# Patient Record
Sex: MALE | Race: WHITE | NOT HISPANIC OR LATINO | Employment: OTHER | URBAN - NONMETROPOLITAN AREA
[De-identification: names, ages, dates, MRNs, and addresses within clinical notes are randomized per-mention and may not be internally consistent; named-entity substitution may affect disease eponyms.]

---

## 2023-03-02 PROBLEM — J11.1 INFLUENZA: Status: ACTIVE | Noted: 2023-03-02

## 2023-03-02 PROBLEM — M67.439 GANGLION CYST OF WRIST: Status: ACTIVE | Noted: 2023-03-02

## 2023-03-02 PROBLEM — M25.539 WRIST PAIN: Status: ACTIVE | Noted: 2023-03-02

## 2023-03-09 ENCOUNTER — OFFICE VISIT (OUTPATIENT)
Dept: PRIMARY CARE | Facility: CLINIC | Age: 28
End: 2023-03-09
Payer: COMMERCIAL

## 2023-03-09 VITALS
WEIGHT: 232 LBS | DIASTOLIC BLOOD PRESSURE: 78 MMHG | OXYGEN SATURATION: 98 % | HEIGHT: 76 IN | HEART RATE: 70 BPM | BODY MASS INDEX: 28.25 KG/M2 | SYSTOLIC BLOOD PRESSURE: 118 MMHG

## 2023-03-09 DIAGNOSIS — R00.2 PALPITATION: Primary | ICD-10-CM

## 2023-03-09 DIAGNOSIS — F41.9 ANXIETY: ICD-10-CM

## 2023-03-09 LAB
ALANINE AMINOTRANSFERASE (SGPT) (U/L) IN SER/PLAS: 29 U/L (ref 10–52)
ALBUMIN (G/DL) IN SER/PLAS: 4.8 G/DL (ref 3.4–5)
ALKALINE PHOSPHATASE (U/L) IN SER/PLAS: 59 U/L (ref 33–120)
ANION GAP IN SER/PLAS: 11 MMOL/L (ref 10–20)
ASPARTATE AMINOTRANSFERASE (SGOT) (U/L) IN SER/PLAS: 16 U/L (ref 9–39)
BILIRUBIN TOTAL (MG/DL) IN SER/PLAS: 0.5 MG/DL (ref 0–1.2)
CALCIUM (MG/DL) IN SER/PLAS: 9.5 MG/DL (ref 8.6–10.3)
CARBON DIOXIDE, TOTAL (MMOL/L) IN SER/PLAS: 30 MMOL/L (ref 21–32)
CHLORIDE (MMOL/L) IN SER/PLAS: 102 MMOL/L (ref 98–107)
CHOLESTEROL (MG/DL) IN SER/PLAS: 209 MG/DL (ref 0–199)
CHOLESTEROL IN HDL (MG/DL) IN SER/PLAS: 39.4 MG/DL
CHOLESTEROL/HDL RATIO: 5.3
CREATININE (MG/DL) IN SER/PLAS: 0.93 MG/DL (ref 0.5–1.3)
GFR MALE: >90 ML/MIN/1.73M2
GLUCOSE (MG/DL) IN SER/PLAS: 81 MG/DL (ref 74–99)
LDL: 142 MG/DL (ref 0–99)
POTASSIUM (MMOL/L) IN SER/PLAS: 4.2 MMOL/L (ref 3.5–5.3)
PROTEIN TOTAL: 6.6 G/DL (ref 6.4–8.2)
SODIUM (MMOL/L) IN SER/PLAS: 139 MMOL/L (ref 136–145)
TRIGLYCERIDE (MG/DL) IN SER/PLAS: 139 MG/DL (ref 0–149)
UREA NITROGEN (MG/DL) IN SER/PLAS: 17 MG/DL (ref 6–23)
VLDL: 28 MG/DL (ref 0–40)

## 2023-03-09 PROCEDURE — 93000 ELECTROCARDIOGRAM COMPLETE: CPT | Performed by: FAMILY MEDICINE

## 2023-03-09 PROCEDURE — 36415 COLL VENOUS BLD VENIPUNCTURE: CPT | Performed by: FAMILY MEDICINE

## 2023-03-09 PROCEDURE — 80053 COMPREHEN METABOLIC PANEL: CPT

## 2023-03-09 PROCEDURE — 99204 OFFICE O/P NEW MOD 45 MIN: CPT | Performed by: FAMILY MEDICINE

## 2023-03-09 PROCEDURE — 80061 LIPID PANEL: CPT

## 2023-03-09 RX ORDER — ESCITALOPRAM OXALATE 10 MG/1
10 TABLET ORAL DAILY
Qty: 90 TABLET | Refills: 3 | Status: SHIPPED | OUTPATIENT
Start: 2023-03-09 | End: 2024-03-08

## 2023-03-09 ASSESSMENT — ENCOUNTER SYMPTOMS
FEVER: 0
MYALGIAS: 0
SHORTNESS OF BREATH: 0
UNEXPECTED WEIGHT CHANGE: 0
DIFFICULTY URINATING: 0
CHEST TIGHTNESS: 0
SLEEP DISTURBANCE: 0
COUGH: 0
RHINORRHEA: 0
DIARRHEA: 0
FATIGUE: 0
AGITATION: 0
NERVOUS/ANXIOUS: 0
WHEEZING: 0
CONSTIPATION: 0
ABDOMINAL DISTENTION: 0
ABDOMINAL PAIN: 0
PALPITATIONS: 0
DYSURIA: 0

## 2023-03-09 ASSESSMENT — PATIENT HEALTH QUESTIONNAIRE - PHQ9
2. FEELING DOWN, DEPRESSED OR HOPELESS: NOT AT ALL
1. LITTLE INTEREST OR PLEASURE IN DOING THINGS: NOT AT ALL
SUM OF ALL RESPONSES TO PHQ9 QUESTIONS 1 AND 2: 0

## 2023-03-09 NOTE — PROGRESS NOTES
"Subjective   Patient ID: Donavan Cabrera is a 27 y.o. male who presents for Anxiety (Under stress at work, heart beats hard).   Problems occurs mainly at rest  Heart races at times, light headed nauseated   Starts fast slowly goes away  Played basketball 1 month ago did OK   FH:anxiety moms side Dads side also  Has his own construction crew  Very anxious at times    HPI     Review of Systems   Constitutional:  Negative for fatigue, fever and unexpected weight change.   HENT:  Negative for congestion, hearing loss, postnasal drip and rhinorrhea.    Eyes:  Negative for visual disturbance.   Respiratory:  Negative for cough, chest tightness, shortness of breath and wheezing.    Cardiovascular:  Negative for chest pain, palpitations and leg swelling.   Gastrointestinal:  Negative for abdominal distention, abdominal pain, constipation and diarrhea.   Genitourinary:  Negative for difficulty urinating and dysuria.   Musculoskeletal:  Negative for myalgias.   Skin:  Negative for rash.   Psychiatric/Behavioral:  Negative for agitation and sleep disturbance. The patient is not nervous/anxious.        Objective   /78   Pulse 70   Ht 1.918 m (6' 3.5\")   Wt 105 kg (232 lb)   SpO2 98%   BMI 28.62 kg/m²     Physical Exam  Constitutional:       General: He is not in acute distress.     Appearance: Normal appearance. He is normal weight. He is not ill-appearing, toxic-appearing or diaphoretic.   HENT:      Head: Normocephalic.      Right Ear: Tympanic membrane, ear canal and external ear normal.      Left Ear: Tympanic membrane, ear canal and external ear normal.      Nose: Nose normal. No congestion.      Mouth/Throat:      Mouth: Mucous membranes are moist.   Eyes:      General:         Right eye: No discharge.         Left eye: No discharge.      Extraocular Movements: Extraocular movements intact.      Conjunctiva/sclera: Conjunctivae normal.      Pupils: Pupils are equal, round, and reactive to light.      Comments: L "   Neck:      Vascular: No carotid bruit.   Cardiovascular:      Rate and Rhythm: Normal rate and regular rhythm.      Pulses: Normal pulses.      Heart sounds: No murmur heard.     No gallop.   Pulmonary:      Effort: No respiratory distress.      Breath sounds: No wheezing, rhonchi or rales.   Abdominal:      General: There is no distension.      Palpations: There is no mass.      Tenderness: There is no abdominal tenderness.   Musculoskeletal:         General: No swelling.      Cervical back: No tenderness.      Right lower leg: No edema.      Left lower leg: No edema.   Lymphadenopathy:      Cervical: No cervical adenopathy.   Skin:     Findings: No bruising or rash.   Neurological:      Mental Status: He is alert and oriented to person, place, and time.   Psychiatric:         Mood and Affect: Mood normal.         Behavior: Behavior normal.         Assessment/Plan   Problem List Items Addressed This Visit       Anxiety    Relevant Medications    escitalopram (Lexapro) 10 mg tablet    Palpitation - Primary    Relevant Orders    ECG 12 lead    Comprehensive metabolic panel (Completed)    Lipid Panel (Completed)   Patient can work at very physical labor and play basketball without been short of breath so I doubt significant cardiac pathology.  He did however have a murmur consistent with possible mitral regurg/mitral valve prolapse.  I do feel echocardiogram is warranted.  EKG was unremarkable.  Lab was performed check cholesterol CMP make sure is not hypokalemic or significant electrolyte disturbance.  There is family history of hypercholesterolemia but not early onset CAD.  Patient with significant stressors from work has children at home do feel he needs medicine for anxiety and help to keep him from working as much. .  There is a family history of anxiety

## 2023-03-10 PROBLEM — E78.00 HYPERCHOLESTEROLEMIA: Status: ACTIVE | Noted: 2023-03-10

## 2024-09-25 ENCOUNTER — OFFICE VISIT (OUTPATIENT)
Dept: PRIMARY CARE | Facility: CLINIC | Age: 29
End: 2024-09-25
Payer: COMMERCIAL

## 2024-09-25 VITALS
OXYGEN SATURATION: 97 % | HEART RATE: 79 BPM | WEIGHT: 246 LBS | DIASTOLIC BLOOD PRESSURE: 62 MMHG | SYSTOLIC BLOOD PRESSURE: 110 MMHG | BODY MASS INDEX: 29.96 KG/M2 | HEIGHT: 76 IN

## 2024-09-25 DIAGNOSIS — D49.2 SKIN GROWTH: ICD-10-CM

## 2024-09-25 DIAGNOSIS — F41.9 ANXIETY: Primary | ICD-10-CM

## 2024-09-25 PROCEDURE — 11302 SHAVE SKIN LESION 1.1-2.0 CM: CPT

## 2024-09-25 PROCEDURE — 99024 POSTOP FOLLOW-UP VISIT: CPT

## 2024-09-25 RX ORDER — ESCITALOPRAM OXALATE 10 MG/1
10 TABLET ORAL DAILY
Qty: 90 TABLET | Refills: 3 | Status: SHIPPED | OUTPATIENT
Start: 2024-09-25 | End: 2025-09-25

## 2024-09-25 NOTE — ASSESSMENT & PLAN NOTE
Refilled escitalopram 10mg   Last year 3/9/2023 - had ECG and work up for palpitations, was normal workup and felt to be related to anxiety, patient did not get echo done  Orders:    escitalopram (Lexapro) 10 mg tablet; Take 1 tablet (10 mg) by mouth once daily.

## 2024-09-25 NOTE — PROGRESS NOTES
"Subjective   Patient ID: Donavan Cabrera is a 29 y.o. male who presents for Stress (Discuss mole removal, medication refill).  HPI  Donavan presents for refill of esctialopram   He states that he has lots of stress at work  Escitalopram works well for managing his stress and anxiety   He states when he gets anxious/stressed he gets palpitations    He also states that he has numerous moles on his face, chest, all over  He states he has a large black mole on his L inner thigh, it does not hurt   He has had it for awhile   The location of where it is irritates him   Also a mole on his face he would like removed      No past surgical history on file.   Past Medical History:   Diagnosis Date    Pneumonia, unspecified organism 03/05/2018    Community acquired pneumonia     Social History     Tobacco Use    Smoking status: Every Day     Current packs/day: 0.50     Average packs/day: 0.5 packs/day for 10.0 years (5.0 ttl pk-yrs)     Types: Cigarettes    Smokeless tobacco: Never   Substance Use Topics    Alcohol use: Never    Drug use: Never        Review of Systems  10 point review of systems performed and is negative except as noted in the HPI.      Current Outpatient Medications:     escitalopram (Lexapro) 10 mg tablet, Take 1 tablet (10 mg) by mouth once daily., Disp: 90 tablet, Rfl: 3     Objective   /62 (BP Location: Left arm, Patient Position: Sitting, BP Cuff Size: Large adult)   Pulse 79   Ht 1.924 m (6' 3.75\")   Wt 112 kg (246 lb)   SpO2 97%   BMI 30.14 kg/m²     Physical Exam  Constitutional:       General: He is not in acute distress.     Appearance: Normal appearance. He is not ill-appearing or toxic-appearing.   HENT:      Head: Normocephalic and atraumatic.      Right Ear: Tympanic membrane, ear canal and external ear normal.      Left Ear: Tympanic membrane, ear canal and external ear normal.      Nose: Nose normal. No congestion or rhinorrhea.      Mouth/Throat:      Mouth: Mucous membranes are moist. "      Pharynx: Oropharynx is clear. No oropharyngeal exudate or posterior oropharyngeal erythema.   Eyes:      Conjunctiva/sclera: Conjunctivae normal.      Pupils: Pupils are equal, round, and reactive to light.   Cardiovascular:      Rate and Rhythm: Normal rate and regular rhythm.   Pulmonary:      Effort: Pulmonary effort is normal.      Breath sounds: Normal breath sounds. No wheezing, rhonchi or rales.   Abdominal:      General: Bowel sounds are normal. There is no distension.      Palpations: Abdomen is soft. There is no mass.      Tenderness: There is no abdominal tenderness. There is no guarding or rebound.   Musculoskeletal:         General: Normal range of motion.      Cervical back: Normal range of motion and neck supple.      Right lower leg: No edema.      Left lower leg: No edema.   Lymphadenopathy:      Cervical: No cervical adenopathy.   Skin:     General: Skin is warm and dry.      Findings: No rash.             Comments: Approx 2cm dark brown mole present on L inner thigh. It is oval shaped.   Many moles present on face and chest. One mole that appears to be a skin tag is present on L side of face, just above the L ear.    Neurological:      Mental Status: He is alert and oriented to person, place, and time.   Psychiatric:         Mood and Affect: Mood normal.         Behavior: Behavior normal.       Patient ID: Donavan Cabrera is a 29 y.o. male.    Shaving Epidermal/Dermal    Date/Time: 9/25/2024 9:00 AM    Performed by: Jennifer Hudson PA-C  Authorized by: Jennifer Hudson PA-C    Number of Lesions: 2  Lesion 1:     Initial size (mm): 2    Final defect size (mm): 3    Malignancy: benign lesion      Destruction method: curettage    Lesion 2:     Body area: head/neck    Head/neck location: above L ear.    Initial size (mm): 1    Final defect size (mm): 1    Malignancy: benign lesion      Destruction method: curettage      Comments:  Procedure: shave biopsy   Risks, Benefits, Alternatives discussed- verbal  consent given  Area cleaned with alcohol x3  anesthetized with: 2% lido with epi (3mL)  Area cleaned with betadine x3  Procedure:  Inner thigh:  patient was prepped in a sterile fashion. 3mL of lidocaine with epi was injected. The lesion was then shaved with a dermblade. Pressure was held. Bacitracin was applied and a band-aid was placed.   L side of face above ear: patient was prepped in a sterile fashion. 0.5mL of lidocaine with epi was injected. The lesion was then shaved with a dermblade. Pressure was held. The hyfrecator was used. Bacitracin was applied and a band-aid was placed.   Patient tolerated well, no complications, instructions given      Assessment & Plan  Anxiety  Refilled escitalopram 10mg   Last year 3/9/2023 - had ECG and work up for palpitations, was normal workup and felt to be related to anxiety, patient did not get echo done  Orders:    escitalopram (Lexapro) 10 mg tablet; Take 1 tablet (10 mg) by mouth once daily.    Skin growth  Removed via shave biopsy lesion on L inner thigh, looks like seborrheic keratosis but sent for pathology.   Removed skin tag from face via shave biopsy.   Orders:    Dermatopathology- [facility]          Discussed at visit any disease processes that were of concern as well as the risks, benefits and instructions on any new medication provided. Patient (and/or caretaker of patient if present) stated all questions were answered, and they voiced understanding of instructions.      Jennifer Hudson PA-C

## 2024-10-01 LAB
LABORATORY COMMENT REPORT: NORMAL
PATH REPORT.FINAL DX SPEC: NORMAL
PATH REPORT.GROSS SPEC: NORMAL
PATH REPORT.MICROSCOPIC SPEC OTHER STN: NORMAL
PATH REPORT.RELEVANT HX SPEC: NORMAL
PATH REPORT.TOTAL CANCER: NORMAL

## 2024-10-07 ENCOUNTER — TELEPHONE (OUTPATIENT)
Dept: PRIMARY CARE | Facility: CLINIC | Age: 29
End: 2024-10-07
Payer: COMMERCIAL